# Patient Record
Sex: MALE | HISPANIC OR LATINO | URBAN - METROPOLITAN AREA
[De-identification: names, ages, dates, MRNs, and addresses within clinical notes are randomized per-mention and may not be internally consistent; named-entity substitution may affect disease eponyms.]

---

## 2019-04-13 ENCOUNTER — EMERGENCY (EMERGENCY)
Facility: HOSPITAL | Age: 29
LOS: 0 days | Discharge: LEFT BEFORE DISCHARGE | End: 2019-04-13
Attending: EMERGENCY MEDICINE | Admitting: EMERGENCY MEDICINE
Payer: SELF-PAY

## 2019-04-13 VITALS
WEIGHT: 149.91 LBS | TEMPERATURE: 98 F | HEIGHT: 65 IN | RESPIRATION RATE: 18 BRPM | HEART RATE: 117 BPM | OXYGEN SATURATION: 95 % | DIASTOLIC BLOOD PRESSURE: 73 MMHG | SYSTOLIC BLOOD PRESSURE: 140 MMHG

## 2019-04-13 DIAGNOSIS — F10.129 ALCOHOL ABUSE WITH INTOXICATION, UNSPECIFIED: ICD-10-CM

## 2019-04-13 DIAGNOSIS — R41.0 DISORIENTATION, UNSPECIFIED: ICD-10-CM

## 2019-04-13 PROCEDURE — 99285 EMERGENCY DEPT VISIT HI MDM: CPT

## 2019-04-13 NOTE — ED ADULT NURSE NOTE - CHIEF COMPLAINT QUOTE
found outside of [facility] intoxicated. unable to answer questions appropriately. No trauma noted. denies any pain or complaints.

## 2019-04-13 NOTE — ED ADULT TRIAGE NOTE - CHIEF COMPLAINT QUOTE
found outside of [facility] intoxicated. unable to answer questions appropriately. found outside of [facility] intoxicated. unable to answer questions appropriately. No trauma noted. denies any pain or complaints.

## 2019-04-13 NOTE — ED ADULT NURSE NOTE - NS ED NURSE ELOPE COMMENTS
Pt walked out. Refuse to stay for medical clearance/discharge. Wayne Connelly, charge RN notified. Dr. Weiner notified.

## 2019-04-13 NOTE — ED ADULT NURSE REASSESSMENT NOTE - NS ED NURSE REASSESS COMMENT FT1
Pt eloped & walked out of ER. Pt Refuses to stay for medical clearance/discharge or vitals. Wayne Connelly, charge RN notified. Dr. Weiner notified.

## 2019-04-13 NOTE — ED PROVIDER NOTE - OBJECTIVE STATEMENT
30 y/o male in ED found in front of restaurant intoxicated.    pt states that he has been drinking tonight.   no complaints.

## 2019-04-13 NOTE — ED ADULT NURSE NOTE - OBJECTIVE STATEMENT
Possible ETOH intoxication. Pt sleeping at present. Arousalable to touch. VSS.  Will reassess pt upon waking up. Will continue to monitor

## 2019-04-13 NOTE — ED PROVIDER NOTE - PROGRESS NOTE DETAILS
pt noted by staff to bwe walking around ED without difficulty and then walked out of ED.   will d/c as LWCT

## 2019-04-13 NOTE — ED ADULT NURSE NOTE - NSIMPLEMENTINTERV_GEN_ALL_ED
Implemented All Fall Risk Interventions:  [place] to call system. Call bell, personal items and telephone within reach. Instruct patient to call for assistance. Room bathroom lighting operational. Non-slip footwear when patient is off stretcher. Physically safe environment: no spills, clutter or unnecessary equipment. Stretcher in lowest position, wheels locked, appropriate side rails in place. Provide visual cue, wrist band, yellow gown, etc. Monitor gait and stability. Monitor for mental status changes and reorient to person, place, and time. Review medications for side effects contributing to fall risk. Reinforce activity limits and safety measures with patient and family.

## 2020-02-23 ENCOUNTER — EMERGENCY (EMERGENCY)
Facility: HOSPITAL | Age: 30
LOS: 0 days | Discharge: HOME | End: 2020-02-23
Attending: EMERGENCY MEDICINE
Payer: SELF-PAY

## 2020-02-23 VITALS
SYSTOLIC BLOOD PRESSURE: 137 MMHG | DIASTOLIC BLOOD PRESSURE: 87 MMHG | RESPIRATION RATE: 18 BRPM | WEIGHT: 149.91 LBS | HEART RATE: 80 BPM | TEMPERATURE: 98 F | HEIGHT: 67 IN | OXYGEN SATURATION: 97 %

## 2020-02-23 DIAGNOSIS — F10.120 ALCOHOL ABUSE WITH INTOXICATION, UNCOMPLICATED: ICD-10-CM

## 2020-02-23 DIAGNOSIS — F10.129 ALCOHOL ABUSE WITH INTOXICATION, UNSPECIFIED: ICD-10-CM

## 2020-02-23 PROBLEM — Z78.9 OTHER SPECIFIED HEALTH STATUS: Chronic | Status: ACTIVE | Noted: 2019-04-13

## 2020-02-23 PROCEDURE — 99053 MED SERV 10PM-8AM 24 HR FAC: CPT

## 2020-02-23 PROCEDURE — 99285 EMERGENCY DEPT VISIT HI MDM: CPT

## 2020-02-23 PROCEDURE — 99283 EMERGENCY DEPT VISIT LOW MDM: CPT

## 2020-02-23 NOTE — ED PROVIDER NOTE - NSFOLLOWUPINSTRUCTIONS_ED_ALL_ED_FT
Alcohol Use Disorder    WHAT YOU NEED TO KNOW:    Alcohol use disorder (AUD) is problem drinking. AUD includes alcohol abuse and alcohol dependency.     DISCHARGE INSTRUCTIONS:    Seek care immediately if:     Your heart is beating faster than usual.      You have hallucinations.      You cannot remember what happens while you are drinking.      You have seizures.    Contact your healthcare provider if:     You are anxious and have nausea.      Your hands are shaky and you are sweating heavily.      You have questions or concerns about your condition or care.    Follow up with your healthcare provider as directed: Do not try to stop drinking on your own. Your healthcare provider may need to help you withdraw from alcohol safely. He may need to admit you to the hospital. You may also need any of the following treatments:    Medicines to decrease your craving for alcohol      Support groups such as Alcoholics Anonymous       Therapy from a psychiatrist or psychologist       Admission to an inpatient facility for treatment for severe AUD    Interested in discussing options to reduce your alcohol or drug use?      [facility]: [phone]   [facility] Substance Abuse Services: [phone], option #2   Methadone Maintenance & Ambulatory Opiate Detox: [phone]  [facility] Outreach: [phone]  [facility]: [phone]  [facility]: [phone]    [facility]: [phone], option #2   [facility]: [phone]    [facility]: [phone]    [facility] Central Intake: [phone]  [facility] Chemical Dependency/Ancillary Withdrawal: [phone]  [facility] Methadone Maintenance: [phone]    [facility]: [phone]  [facility] Addiction Treatment Services: [phone]    [facility] HopeLine: 4-478-9-HOPENY    [facility] Office of Alcoholism and Substance Abuse Services (OASAS): https://www.oasas.[place].gov/providerdirectory/  [facility] for Addiction Services and Psychotherapy Interventions Research (CASPIR)  www.[facility].org     Interested in discussing options to reduce your tobacco use?    [facility] for Tobacco Control:  [phone]  [facility] QUITLINE: 0-158-GC-QUITS ([phone])    Interested in learning more about substance use?      http://rethinkingdrinking.niaaa.nih.gov   https://www.drugabuse.gov/patients-families     Learn more about opioid overdose prevention programs in [facility]:  http://www.[facility].ny.[facility]/diseases/aids/general/opioid_overdose_prevention/

## 2020-02-23 NOTE — ED PROVIDER NOTE - CLINICAL SUMMARY MEDICAL DECISION MAKING FREE TEXT BOX
Alcohol use and abuse.  Intoxicated tonight and fell asleep on strangers porch.  Police brought patient to ED.  No other complaints.  Patient walking, eating, drinking, talking.  Patient has safe ride home directly to his house.

## 2020-02-23 NOTE — ED PROVIDER NOTE - PROGRESS NOTE DETAILS
Patient called his brother who got him an Uber home. Encounter translated by Nurse Trevino.  Patient also was speaking english the entire time.

## 2020-02-23 NOTE — ED PROVIDER NOTE - OBJECTIVE STATEMENT
Pt. is a 31 yo M with a hx of alcohol abuse BIB police as he was found sleeping on somebody's porch.  Patient states he drank last night and was tired so slept on the porch.  Patient has no complaints.  No falls.  Patient speaking full clear sentences in the ED and able to ambulate independently.  No fighting, injury, vomiting, chest pain, dizziness per patient.

## 2020-02-23 NOTE — ED PROVIDER NOTE - PATIENT PORTAL LINK FT
You can access the FollowMyHealth Patient Portal offered by [facility] by registering at the following website: [email or web address]. By joining [employer]’s FollowMyHealth portal, you will also be able to view your health information using other applications (apps) compatible with our system.

## 2020-02-23 NOTE — ED PROVIDER NOTE - NSFOLLOWUPCLINICS_GEN_ALL_ED_FT
A Family Medicine Doctor  Family Medicine  .  [place]   Phone:   Fax:   Follow Up Time:     A Therapist  Psychiatry  .  [place]   Phone:   Fax:   Follow Up Time:

## 2020-02-23 NOTE — ED ADULT TRIAGE NOTE - CHIEF COMPLAINT QUOTE
Patient found sleeping on someone's porch. +ETOH. Patient with no medical complaints at this time and wants to go home.

## 2021-08-11 ENCOUNTER — EMERGENCY (EMERGENCY)
Facility: HOSPITAL | Age: 31
LOS: 0 days | Discharge: HOME | End: 2021-08-11
Attending: EMERGENCY MEDICINE
Payer: SELF-PAY

## 2021-08-11 VITALS
HEIGHT: 67 IN | HEART RATE: 83 BPM | SYSTOLIC BLOOD PRESSURE: 139 MMHG | WEIGHT: 160.06 LBS | RESPIRATION RATE: 18 BRPM | TEMPERATURE: 97 F | OXYGEN SATURATION: 98 % | DIASTOLIC BLOOD PRESSURE: 86 MMHG

## 2021-08-11 DIAGNOSIS — M79.672 PAIN IN LEFT FOOT: ICD-10-CM

## 2021-08-11 PROCEDURE — 99283 EMERGENCY DEPT VISIT LOW MDM: CPT

## 2021-08-11 PROCEDURE — 73630 X-RAY EXAM OF FOOT: CPT | Mod: 26,LT

## 2021-08-11 PROCEDURE — 73630 X-RAY EXAM OF FOOT: CPT | Mod: LT

## 2021-08-11 PROCEDURE — 99283 EMERGENCY DEPT VISIT LOW MDM: CPT | Mod: 25

## 2021-08-11 RX ORDER — IBUPROFEN 200 MG
1 TABLET ORAL
Qty: 12 | Refills: 0
Start: 2021-08-11 | End: 2021-08-13

## 2021-08-11 RX ORDER — IBUPROFEN 200 MG
600 TABLET ORAL ONCE
Refills: 0 | Status: COMPLETED | OUTPATIENT
Start: 2021-08-11 | End: 2021-08-11

## 2021-08-11 RX ADMIN — Medication 600 MILLIGRAM(S): at 15:50

## 2021-08-11 NOTE — ED ADULT TRIAGE NOTE - CHIEF COMPLAINT QUOTE
c/o left foot pain/swelling started one week ago, unable to bare weight on left foot, denies acute injury

## 2021-08-11 NOTE — ED STATDOCS - OBJECTIVE STATEMENT
30 y/o male with a PMHx of EtOH abuse presents to the ED c/o left foot pain and swelling x1 week. Pain worse over the last 2 days. No known trauma or injury. Took Tylenol this morning without improvement. No other complaints at this time. 30 y/o male with a PMHx of EtOH use presents to the ED c/o left foot pain and swelling x1 week. Pain worse over the last 2 days. No known trauma or injury. Took Tylenol this morning without improvement. No other complaints at this time.

## 2021-08-11 NOTE — ED STATDOCS - PATIENT PORTAL LINK FT
You can access the FollowMyHealth Patient Portal offered by [facility] by registering at the following website: [email or web address]. By joining [employer]’s FollowMyHealth portal, you will also be able to view your health information using other applications (apps) compatible with our system.

## 2021-08-11 NOTE — ED STATDOCS - MUSCULOSKELETAL, MLM
range of motion is not limited and there is no muscle tenderness. Mild left foot tenderness. Mild edema. No deformity.  2+ DP pulse. No lesions or cellulitis.

## 2021-08-11 NOTE — ED STATDOCS - NSFOLLOWUPINSTRUCTIONS_ED_ALL_ED_FT
Foot Pain    Many things can cause foot pain. Some common causes are:  •An injury.      •A sprain.      •Arthritis.      •Blisters.      •Bunions.        Follow these instructions at home:      Managing pain, stiffness, and swelling   If directed, put ice on the painful area:  •Put ice in a plastic bag.      •Place a towel between your skin and the bag.      •Leave the ice on for 20 minutes, 2–3 times a day.      Activity     • Do not stand or walk for long periods.      •Return to your normal activities as told by your health care provider. Ask your health care provider what activities are safe for you.      •Do stretches to relieve foot pain and stiffness as told by your health care provider.      • Do not lift anything that is heavier than 10 lb (4.5 kg), or the limit that you are told, until your health care provider says that it is safe. Lifting a lot of weight can put added pressure on your feet.      Lifestyle     •Wear comfortable, supportive shoes that fit you well. Do not wear high heels.      •Keep your feet clean and dry.      General instructions     •Take over-the-counter and prescription medicines only as told by your health care provider.      •Rub your foot gently.      •Pay attention to any changes in your symptoms.      •Keep all follow-up visits as told by your health care provider. This is important.        Contact a health care provider if:    •Your pain does not get better after a few days of self-care.      •Your pain gets worse.      •You cannot stand on your foot.        Get help right away if:    •Your foot is numb or tingling.      •Your foot or toes are swollen.      •Your foot or toes turn white or blue.      •You have warmth and redness along your foot.        Summary    •Common causes of foot pain are injury, sprain, arthritis, blisters or bunions.      •Ice, medicines, and comfortable shoes may help foot pain.      •Contact your health care provider if your pain does not get better after a few days of self-care.      This information is not intended to replace advice given to you by your health care provider. Make sure you discuss any questions you have with your health care provider.

## 2021-08-11 NOTE — ED STATDOCS - PROGRESS NOTE DETAILS
Pt is a 32 y/o male with a PMHx of EtOH abuse presents to the ED c/o left foot pain and swelling x1 week. Pain worse over the last 2 days. No known trauma or injury. Took Tylenol this morning without improvement. No other complaints at this time.  Pt. works on his feet.  Plan for xray, pain control. Nell Soria PA-C Mild TTP over ball of foot with mild swelling dorsum of foot.  Neg. xray.  Will ace wrap and podiatry follow up.  Nell Soria PA-C

## 2021-08-11 NOTE — ED STATDOCS - CLINICAL SUMMARY MEDICAL DECISION MAKING FREE TEXT BOX
Mild TTP over ball of foot with mild swelling dorsum of foot.  Neg. xray.  Will ace wrap and podiatry follow up.  Nell Soria PA-C

## 2023-04-09 ENCOUNTER — EMERGENCY (EMERGENCY)
Facility: HOSPITAL | Age: 33
LOS: 0 days | Discharge: HOME | End: 2023-04-10
Attending: EMERGENCY MEDICINE
Payer: SELF-PAY

## 2023-04-09 VITALS
DIASTOLIC BLOOD PRESSURE: 97 MMHG | OXYGEN SATURATION: 100 % | SYSTOLIC BLOOD PRESSURE: 143 MMHG | HEART RATE: 117 BPM | HEIGHT: 67 IN | RESPIRATION RATE: 20 BRPM | WEIGHT: 160.06 LBS | TEMPERATURE: 98 F

## 2023-04-09 DIAGNOSIS — F10.920 ALCOHOL USE, UNSPECIFIED WITH INTOXICATION, UNCOMPLICATED: ICD-10-CM

## 2023-04-09 PROCEDURE — 96372 THER/PROPH/DIAG INJ SC/IM: CPT

## 2023-04-09 PROCEDURE — 70450 CT HEAD/BRAIN W/O DYE: CPT | Mod: MA

## 2023-04-09 PROCEDURE — 99285 EMERGENCY DEPT VISIT HI MDM: CPT

## 2023-04-09 PROCEDURE — 93005 ELECTROCARDIOGRAM TRACING: CPT

## 2023-04-09 PROCEDURE — 82962 GLUCOSE BLOOD TEST: CPT

## 2023-04-09 PROCEDURE — 93010 ELECTROCARDIOGRAM REPORT: CPT

## 2023-04-09 PROCEDURE — 70450 CT HEAD/BRAIN W/O DYE: CPT | Mod: 26,MA

## 2023-04-09 PROCEDURE — 72125 CT NECK SPINE W/O DYE: CPT | Mod: MA

## 2023-04-09 PROCEDURE — 72125 CT NECK SPINE W/O DYE: CPT | Mod: 26,MA

## 2023-04-09 PROCEDURE — 99285 EMERGENCY DEPT VISIT HI MDM: CPT | Mod: 25

## 2023-04-09 RX ORDER — HALOPERIDOL DECANOATE 100 MG/ML
5 INJECTION INTRAMUSCULAR ONCE
Refills: 0 | Status: COMPLETED | OUTPATIENT
Start: 2023-04-09 | End: 2023-04-09

## 2023-04-09 RX ADMIN — HALOPERIDOL DECANOATE 5 MILLIGRAM(S): 100 INJECTION INTRAMUSCULAR at 21:10

## 2023-04-09 RX ADMIN — Medication 2 MILLIGRAM(S): at 21:10

## 2023-04-09 NOTE — ED PROVIDER NOTE - NSFOLLOWUPINSTRUCTIONS_ED_ALL_ED_FT
Alcohol Use Disorder    WHAT YOU NEED TO KNOW:    Alcohol use disorder (AUD) is problem drinking. AUD includes alcohol abuse and alcohol dependency.     DISCHARGE INSTRUCTIONS:    Seek care immediately if:     Your heart is beating faster than usual.      You have hallucinations.      You cannot remember what happens while you are drinking.      You have seizures.    Contact your healthcare provider if:     You are anxious and have nausea.      Your hands are shaky and you are sweating heavily.      You have questions or concerns about your condition or care.    Follow up with your healthcare provider as directed: Do not try to stop drinking on your own. Your healthcare provider may need to help you withdraw from alcohol safely. He may need to admit you to the hospital. You may also need any of the following treatments:    Medicines to decrease your craving for alcohol      Support groups such as Alcoholics Anonymous       Therapy from a psychiatrist or psychologist       Admission to an inpatient facility for treatment for severe AUD    Interested in discussing options to reduce your alcohol or drug use?      [facility]: [phone]   [facility] Substance Abuse Services: [phone], option #2   Methadone Maintenance & Ambulatory Opiate Detox: [phone]  [facility] Outreach: [phone]  [facility]: [phone]  [facility]: [phone]    [facility]: [phone], option #2   [facility]: [phone]    [facility]: [phone]    [facility] Central Intake: [phone]  [facility] Chemical Dependency/Ancillary Withdrawal: [phone]  [facility] Methadone Maintenance: [phone]    [facility]: [phone]  [facility] Addiction Treatment Services: [phone]    [facility] HopeLine: 5-872-5-HOPENY    [facility] Office of Alcoholism and Substance Abuse Services (OASAS): https://www.[place]asas.[place].gov/providerdirectory/  [facility] for Addiction Services and Psychotherapy Interventions Research (CASPIR)  www.[facility].org     Interested in discussing options to reduce your tobacco use?    [facility] for Tobacco Control:  [phone]  [facility] QUITLINE: 7-353-YV-QUITS ([phone])    Interested in learning more about substance use?      http://rethinkingdrinking.niaaa.nih.gov   https://www.drugabuse.gov/patients-families     Learn more about opioid overdose prevention programs in [facility]:  http://www.[facility].ny.[facility]/diseases/aids/general/opioid_overdose_prevention/

## 2023-04-09 NOTE — ED PROVIDER NOTE - CLINICAL SUMMARY MEDICAL DECISION MAKING FREE TEXT BOX
35 yo M p/w intoxication. Plan: EKG, fingerstick, imaging to be obtained. 33 yo M p/w intoxication. Pt was not verbally re-directable, medicated with ativan and haldol Plan: EKG, fingerstick, imaging to be obtained. 33 yo M p/w intoxication. Pt was not verbally re-directable, medicated with ativan and haldol for psychosis,  Plan: EKG, fingerstick, imaging to be obtained. 33 yo M p/w intoxication. Pt was not verbally re-directable, medicated with ativan and haldol for psychosis,  Plan: EKG is NSR at rate of 93, no acute st/t wave changes, normal EKG, fingerstick, imaging to be obtained.

## 2023-04-09 NOTE — ED PROVIDER NOTE - PROGRESS NOTE DETAILS
TLG:  Received signout from Dr. Choudhary.  Patient now awake.  Gave name and .  Registration changing alias name over to real name.  Patient standing and ambulating independently.  Wants to go home.

## 2023-04-09 NOTE — ED PROVIDER NOTE - OBJECTIVE STATEMENT
35 yo M with no PMHx was BIB PD and EMS to ED for intoxication. EMS reports that the police found pt wandering down the street, drunk and harassing people, so brought pt to the ED. Pt admits to drug use, but does not state if he used it today. Denies any trauma.

## 2023-04-09 NOTE — ED ADULT TRIAGE NOTE - CHIEF COMPLAINT QUOTE
Pt BIBEMS to the ED with c/o alcohol intoxication. EMS reports pt was found wondering the streets. Pt + ETOH and is uncooperative in triage. Pt states "I drank a lot". Pt denies falling, pt denies any pain. Pt medicated in triage.

## 2023-04-09 NOTE — ED PROVIDER NOTE - PHYSICAL EXAMINATION
Constitutional: pt is uncooperative, intoxicated, not verbally directable, medicated with ativan and haldol  Eyes: PERRL, EOMI  Head: Normocephalic atraumatic  Mouth: MMM  Cardiac: regular rate   Resp: Lungs CTAB  GI: Abd s/nt/nd  Neuro: CN2-12 intact  Extremities: Intact distal pulses b/l, no calf tenderness, normal ROM b/l UE and LE   Skin: No rashes Constitutional: pt is uncooperative, intoxicated, not verbally directable  Eyes: PERRL, EOMI  Head: Normocephalic atraumatic  Mouth: MMM  Cardiac: regular rate   Resp: Lungs CTAB  GI: Abd s/nt/nd  Neuro: CN2-12 intact  Extremities: Intact distal pulses b/l, normal ROM b/l UE and LE   Skin: No rashes

## 2023-04-09 NOTE — ED PROVIDER NOTE - PATIENT PORTAL LINK FT
You can access the FollowMyHealth Patient Portal offered by [facility] by registering at the following website: [email or web address]. By joining [employer]’s FollowMyHealth portal, you will also be able to view your health information using other applications (apps) compatible with our system.

## 2023-04-09 NOTE — ED PROVIDER NOTE - NS ED ROS FT
Constitutional: No fever or chills, + intoxication  Eyes: No visual changes  HEENT: No throat pain  CV: No chest pain  Resp: No SOB no cough  GI: No abd pain, nausea or vomiting  : No dysuria  MSK: No musculoskeletal pain  Skin: No rash  Neuro: No headache unable to obtain 2/2 intoxication

## 2023-04-09 NOTE — ED PROVIDER NOTE - HIV OFFER
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  Prominent temporal horns.  Hemorrhage most likely due to HTN.    hypertonic saline or manitol  BP goal as close to normal as possible, MAP<110  no asa, AC or any blood thinning medications  repeat CTH in 12hours  consider infectious w/p and palliative care eval  prognosis for meaningful neurological recovery is grave Opt out

## 2023-04-10 VITALS
OXYGEN SATURATION: 100 % | RESPIRATION RATE: 18 BRPM | HEART RATE: 107 BPM | SYSTOLIC BLOOD PRESSURE: 128 MMHG | DIASTOLIC BLOOD PRESSURE: 98 MMHG

## 2024-06-07 NOTE — ED ADULT NURSE NOTE - GENITOURINARY ASSESSMENT
Received refill request for Ozempic from [facility] pharmacy.    Last ov: 02/07/2024 SOY    Next appointment: 06/18/2024 SOY   
Rx reviewed with Dr Zhu and e-prescribed.  
- - -

## 2025-03-13 NOTE — ED PROVIDER NOTE - NS HIV RISK FACTOR YES
[Right] : right foot and ankle [2+] : dorsalis pedis pulse: 2+ [5th] : 5th [] : generalized ligamentous laxity Declined

## 2025-05-20 ENCOUNTER — EMERGENCY (EMERGENCY)
Facility: HOSPITAL | Age: 35
LOS: 0 days | Discharge: HOME | End: 2025-05-20
Attending: STUDENT IN AN ORGANIZED HEALTH CARE EDUCATION/TRAINING PROGRAM
Payer: SELF-PAY

## 2025-05-20 VITALS
TEMPERATURE: 98 F | DIASTOLIC BLOOD PRESSURE: 77 MMHG | SYSTOLIC BLOOD PRESSURE: 156 MMHG | WEIGHT: 167.99 LBS | RESPIRATION RATE: 18 BRPM | HEART RATE: 86 BPM | OXYGEN SATURATION: 98 %

## 2025-05-20 VITALS
SYSTOLIC BLOOD PRESSURE: 135 MMHG | DIASTOLIC BLOOD PRESSURE: 84 MMHG | RESPIRATION RATE: 18 BRPM | HEART RATE: 80 BPM | TEMPERATURE: 98 F | OXYGEN SATURATION: 98 %
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
## 2025-05-20 DIAGNOSIS — M54.50 LOW BACK PAIN, UNSPECIFIED: ICD-10-CM

## 2025-05-20 DIAGNOSIS — M54.42 LUMBAGO WITH SCIATICA, LEFT SIDE: ICD-10-CM
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
## 2025-05-20 PROCEDURE — 99283 EMERGENCY DEPT VISIT LOW MDM: CPT | Mod: 25

## 2025-05-20 PROCEDURE — 99284 EMERGENCY DEPT VISIT MOD MDM: CPT

## 2025-05-20 PROCEDURE — 96372 THER/PROPH/DIAG INJ SC/IM: CPT

## 2025-05-20 RX ORDER — CYCLOBENZAPRINE HYDROCHLORIDE 15 MG/1
1 CAPSULE, EXTENDED RELEASE ORAL
Qty: 9 | Refills: 0
Start: 2025-05-20 | End: 2025-05-22

## 2025-05-20 RX ORDER — IBUPROFEN 200 MG
1 TABLET ORAL
Qty: 12 | Refills: 0
Start: 2025-05-20 | End: 2025-05-22

## 2025-05-20 RX ORDER — LIDOCAINE HYDROCHLORIDE 20 MG/ML
1 JELLY TOPICAL ONCE
Refills: 0 | Status: COMPLETED | OUTPATIENT
Start: 2025-05-20 | End: 2025-05-20

## 2025-05-20 RX ORDER — KETOROLAC TROMETHAMINE 30 MG/ML
30 INJECTION, SOLUTION INTRAMUSCULAR; INTRAVENOUS ONCE
Refills: 0 | Status: DISCONTINUED | OUTPATIENT
Start: 2025-05-20 | End: 2025-05-20

## 2025-05-20 RX ORDER — CYCLOBENZAPRINE HYDROCHLORIDE 15 MG/1
10 CAPSULE, EXTENDED RELEASE ORAL ONCE
Refills: 0 | Status: COMPLETED | OUTPATIENT
Start: 2025-05-20 | End: 2025-05-20

## 2025-05-20 RX ADMIN — KETOROLAC TROMETHAMINE 30 MILLIGRAM(S): 30 INJECTION, SOLUTION INTRAMUSCULAR; INTRAVENOUS at 17:54

## 2025-05-20 RX ADMIN — CYCLOBENZAPRINE HYDROCHLORIDE 10 MILLIGRAM(S): 15 CAPSULE, EXTENDED RELEASE ORAL at 17:54

## 2025-05-20 RX ADMIN — LIDOCAINE HYDROCHLORIDE 1 PATCH: 20 JELLY TOPICAL at 17:54

## 2025-05-20 NOTE — ED STATDOCS - CARE PROVIDERS DIRECT ADDRESSES
[email or web address]

## 2025-05-20 NOTE — ED STATDOCS - PHYSICAL EXAMINATION
Constitutional: NAD, alert, verbal  HEENT: NCAT, EOMi, PERRL  Cardiac: RRR no MRG  Resp: clear, no wheezing or crackles  GI: ab soft ntnd, no r/g  MSK/Ext: no edema. no tenderness or stepoffs midline CTL spine, L lumbar paraspinal ttp, 5/5 strength of L hip, knee and ankle, sensation and pulses intact  Neuro: FIELDS  Skin: No rashes Constitutional: NAD, alert, verbal  Cardiac: RRR no MRG  Resp: clear, no wheezing or crackles  GI: ab soft ntnd, no r/g  MSK/Ext:  no tenderness or stepoffs midline CTL spine, L lumbar paraspinal ttp, 5/5 strength of L hip, knee and ankle, sensation and pulses intact  Neuro: FIELDS  Skin: No rashes

## 2025-05-20 NOTE — ED STATDOCS - NSFOLLOWUPINSTRUCTIONS_ED_ALL_ED_FT
Sciatica    WHAT YOU NEED TO KNOW:    What is sciatica? Sciatica is a condition that causes pain along your sciatic nerve. The sciatic nerve runs from your spine through both sides of your buttocks. It then runs down the back of your thigh, into your lower leg and foot. Any place along your sciatic nerve may be compressed, inflamed, irritated, or stretched.  Sciatica    What causes sciatica? Sciatica may be related to certain activities, poor posture, and physical or psychological stress. Any of the following may cause or increase your risk for sciatica:    A slipped disc or narrowed spinal column that presses on the sciatic nerve    Muscle injury after you twist or lift a heavy object    Swelling from sprained or irritated muscles that press on the sciatic nerve    Extra body weight that increases pressure on your back and legs    A direct blow on the buttocks, thighs, or legs, car accidents, or falls that injure the sciatic nerve    A disease of the spine, such as arthritis, osteoporosis, or cancer  What are the signs and symptoms of sciatica? The symptoms of sciatica may be short-term or long-term:    Pain that goes from the lower back into your buttocks and down the back of your thigh    Numbness or tingling in your buttocks and legs    Muscle weakness, difficulty moving or controlling your leg or foot    Leg pain that increases with standing, sitting, or squatting  How is sciatica diagnosed? Your healthcare provider will ask about other health conditions you may have. Tell your provider about your job, history of back pain, diseases, or surgeries you have had. Your provider will examine you and move your legs to see what increases pain. You may also need any of the following:    X-rays of your back, hip, thigh, or leg may show other problems, such as fractures (broken bones).    A CT scan or MRI may show your sciatic nerve, muscles, and blood vessels. You may be given contrast liquid to help the area show up better in pictures. Tell the healthcare provider if you have ever had an allergic reaction to contrast liquid. Do not enter the MRI room with anything metal. Metal can cause serious injury. Tell the healthcare provider if you have any metal in or on your body.    An electromyography (EMG) test measures the electrical activity of your muscles at rest and with movement.    Nerve conduction tests check how surface nerves and related muscles respond to stimulation. Electrodes with wires or tiny needles are placed on certain areas, such as the buttocks and legs.  How is sciatica treated?    NSAIDs, such as ibuprofen, help decrease swelling, pain, and fever. This medicine is available with or without a doctor's order. NSAIDs can cause stomach bleeding or kidney problems in certain people. If you take blood thinner medicine, always ask your healthcare provider if NSAIDs are safe for you. Always read the medicine label and follow directions.    Acetaminophen decreases pain and fever. It is available without a doctor's order. Ask how much to take and how often to take it. Follow directions. Read the labels of all other medicines you are using to see if they also contain acetaminophen, or ask your doctor or pharmacist. Acetaminophen can cause liver damage if not taken correctly.    Muscle relaxers help decrease pain and muscle spasms.    Ultrasound therapy is a machine that uses sound waves to decrease pain. Topical medicines may be added to help decrease pain and inflammation.    Epidural steroid medicine may include both an anesthetic (numbing medicine) and a steroid. A steroid may decrease swelling and relieve pain. It is given as a shot close to the spine in the area where you have pain.    Chemonucleolysis is an injection given into the damaged disc to soften or shrink the disc.    Surgery may be done to correct problems such as a damaged disc or a tumor in your spine. Surgery may be done to decrease the pressure on the sciatic nerve. Healthcare providers may also release the muscle that may be pressing into your sciatic nerve.  How can I help manage sciatica?    Physical or occupational therapy may be recommended. A physical therapist teaches you exercises to help improve movement and strength, and to decrease pain. An occupational therapist teaches you skills to help with your daily activities.    Assistive devices may make you more comfortable or relieve pressure in the area. You may need back support, such as a back brace. You may need crutches, a cane, or a walker to decrease stress on your lower back and leg muscles. Ask your healthcare provider for more information about assistive devices and how to use them correctly.  How can sciatica be prevented?    Avoid pressure on your back and legs. Do not lift heavy objects, or stand or sit for long periods of time.    Lift objects safely. Keep your back straight and bend your knees when you  an object. Do not bend or twist your back when you lift.  How to Lift Items Safely      Maintain a healthy weight. Ask your healthcare provider what a healthy weight is for you. Your provider can help you create a weight loss plan, if needed.    Exercise as directed. Ask your healthcare provider about the best stretching, warmup, and exercise plan for you.  When should I seek immediate care?    You have trouble controlling your urine or bowel movements.    You have weakness in both legs.    You have numbness in your groin or buttocks.  When should I call my doctor?    You have pain in your lower back at night or when resting.    You have pain in your lower back with numbness below the knee.    You have weakness in one leg only.    You have questions or concerns about your condition or care.  CARE AGREEMENT:    You have the right to help plan your care. Learn about your health condition and how it may be treated. Discuss treatment options with your healthcare providers to decide what care you want to receive. You always have the right to refuse treatment.

## 2025-05-20 NOTE — ED STATDOCS - CARE PROVIDER_API CALL
Suraj Perez  Orthopaedic Surgery  [address] [place] [id number]  Phone: [phone]  Fax: [phone]  Follow Up Time:

## 2025-05-20 NOTE — ED STATDOCS - PROGRESS NOTE DETAILS
34 y/o M with no pertinent PMHx presents to the ED c/o lower back pain radiating all the way down L leg x2 weeks s/p heavy lifting. Reports today he woke up and the pain was so severe he was having difficulty moving. Denies fevers, bowel or bladder incontinence, hx of cancer or IV drug use. Pt has been taking Tylenol intermittently but he has not been taking any Motrin. NKDA. Plan for DC Motrin/Flexeril and spine follow up.  Nell Soria PA-C

## 2025-05-20 NOTE — ED ADULT NURSE NOTE - NSFALLUNIVINTERV_ED_ALL_ED
Bed/Stretcher in lowest position, wheels locked, appropriate side rails in place/Call bell, personal items and telephone in reach/Instruct patient to call for assistance before getting out of bed/chair/stretcher/Non-slip footwear applied when patient is off stretcher/[place] to call system/Physically safe environment - no spills, clutter or unnecessary equipment/Purposeful proactive rounding/Room/bathroom lighting operational, light cord in reach

## 2025-05-20 NOTE — ED STATDOCS - CLINICAL SUMMARY MEDICAL DECISION MAKING FREE TEXT BOX
36 y/o M with no pertinent PMHx presents to the ED c/o lower back pain radiating all the way down L leg x2 weeks s/p heavy lifting. Reports today he woke up and the pain was so severe he was having difficulty moving. Denies fevers, bowel or bladder incontinence, hx of cancer or IV drug use. Pt has been taking Tylenol intermittently but he has not been taking any Motrin. NKDA.    Ddx includes disc herniation vs MSK. Will treat with pain meds and refer to ortho.

## 2025-05-20 NOTE — ED ADULT NURSE NOTE - AVIAN FLU SYMPTOMS
Pt calling in and states he has poison ivy still on hands, fingers,knees and face.  He is asking if he can have another rx called to [facility] [address] [facility] No

## 2025-05-20 NOTE — ED ADULT NURSE NOTE - NSICDXPASTSURGICALHX_GEN_ALL_CORE_FT
There are no Wet Read(s) to document. PAST SURGICAL HISTORY:  No significant past surgical history

## 2025-05-20 NOTE — ED ADULT TRIAGE NOTE - CHIEF COMPLAINT QUOTE
Patient presents to the ER with worsening back pain after lifting a heavy object last week. Patient reports pain is now radiating down left leg. Denies incontinence, numbness/tingling.

## 2025-05-20 NOTE — ED STATDOCS - PATIENT PORTAL LINK FT
You can access the FollowMyHealth Patient Portal offered by [facility] by registering at the following website: [email or web address]. By joining [employer]’s FollowMyHealth portal, you will also be able to view your health information using other applications (apps) compatible with our system.

## 2025-05-21 PROBLEM — Z78.9 OTHER SPECIFIED HEALTH STATUS: Chronic | Status: ACTIVE | Noted: 2023-04-09
